# Patient Record
Sex: MALE | Race: WHITE | URBAN - METROPOLITAN AREA
[De-identification: names, ages, dates, MRNs, and addresses within clinical notes are randomized per-mention and may not be internally consistent; named-entity substitution may affect disease eponyms.]

---

## 2018-12-05 ENCOUNTER — OFFICE VISIT (OUTPATIENT)
Dept: INTERNAL MEDICINE | Facility: CLINIC | Age: 23
End: 2018-12-05
Payer: COMMERCIAL

## 2018-12-05 VITALS
HEART RATE: 70 BPM | OXYGEN SATURATION: 99 % | DIASTOLIC BLOOD PRESSURE: 80 MMHG | WEIGHT: 167 LBS | TEMPERATURE: 98.1 F | SYSTOLIC BLOOD PRESSURE: 130 MMHG | BODY MASS INDEX: 22.34 KG/M2

## 2018-12-05 DIAGNOSIS — K60.2 ANAL FISSURE: Primary | ICD-10-CM

## 2018-12-05 DIAGNOSIS — K62.5 BRBPR (BRIGHT RED BLOOD PER RECTUM): ICD-10-CM

## 2018-12-05 PROCEDURE — 99213 OFFICE O/P EST LOW 20 MIN: CPT | Performed by: INTERNAL MEDICINE

## 2018-12-05 NOTE — PATIENT INSTRUCTIONS
"Anal Fissure:     *  A small \"split\" in the anal canal, almost always a self limited event, healing within days.       *  No specific treatment needed no specific further testing needed unless the symptoms do not get better or if the symptoms change or worsen.      *  Normalize the bowel patterns by increasing the intake of fiber and maintaining adequate water intake (at least 6-8 glasses per day).    *  Take the fiber supplement of your choice:  Capsules, tablets, chewable tablets, powders, etc.   Find one that works best for you and take it regularly.    *  If the stools are consistently harder, then consider a stool softner such as colace (docusate) or senekot to help make the BMs larger and softer so they pass easier.      *  Preparation H (suppositories or cream) or similar over the counter product to help sooth these irritations.    *  Consider using Preparation H wipes after passing BMs as needed.  Do not overcleanse the rectal area as this will lead to more rectal irritation.     *  Sometimes sitting in a bathtub with Epsom salts for 10-15 minutes can help hemorrhoids as well.     *  If you continue to have troubles, then we may need to send you for further testing, but nothing specifically needed now.     "

## 2018-12-05 NOTE — PROGRESS NOTES
SUBJECTIVE:   Kishore Thomas is a 23 year old male who presents to clinic today for the following health issues:      Concern - Blood toilet paper   Onset: 2 months     Description:   Off and on blood on toilet paper after stools for about 2 months  BMs formed, solid, 1-2 times per day.  Notices blood on the toilet paper about half the time.   occ blood streaked on the stool itself.   No straining,   No diarrhea, no melena, no hematochezia.   No fevers, no chills, no abd cramping.   No change sin diet, no changes in fluid intake  No new meds.   No family history of GI cancer or inflammatory bowel disease      Intensity: mild    Progression of Symptoms:  same    Accompanying Signs & Symptoms:  none    Previous history of similar problem:   None     Precipitating factors:   Worsened by: none     Alleviating factors:  Improved by: none     Therapies Tried and outcome: none         Problem list and histories reviewed & adjusted, as indicated.  Additional history: as documented        Reviewed and updated as needed this visit by clinical staff  Allergies  Meds       Reviewed and updated as needed this visit by Provider           Past Medical History:  ---------------------------  Past Medical History:   Diagnosis Date     Varicella without mention of complication        Past Surgical History:  ---------------------------  Past Surgical History:   Procedure Laterality Date     NO HISTORY OF SURGERY         Current Medications:  ---------------------------  Current Outpatient Prescriptions   Medication Sig Dispense Refill     Ibuprofen (ADVIL) 200 MG capsule Take 200 mg by mouth as needed          Allergies:  -------------  Allergies   Allergen Reactions     Nkda [No Known Drug Allergies]        Social History:  -------------------  Social History     Social History     Marital status: Single     Spouse name: N/A     Number of children: N/A     Years of education: N/A     Occupational History      [employer]      Social History Main Topics     Smoking status: Light Tobacco Smoker     Smokeless tobacco: Never Used     Alcohol use 0.0 oz/week     0 Standard drinks or equivalent per week      Comment: occ social use     Drug use: No      Comment: 2/6/2013 kv     Sexual activity: No      Comment: 2/6/13 kv     Other Topics Concern     Not on file     Social History Narrative       Family Medical History:  ------------------------------  Family History   Problem Relation Age of Onset     No Known Problems Mother      No Known Problems Father      No Known Problems Brother      Colon Cancer No family hx of      Inflammatory Bowel Disease No family hx of      Coronary Artery Disease Early Onset No family hx of          ROS:  REVIEW OF SYSTEMS:    RESP: negative for cough, dyspnea, wheezing, hemoptysis  CV: negative for chest pain, palpitations, PND, GOLDMAN, orthopnea; reports no changes in their ability to perform physical activity (from cardiovascular standpoint)  GI: negative for dysphagia, N/V, pain, melena, diarrhea and constipation  NEURO: negative for numbness/tingling, paralysis, incoordination, or focal weakness     OBJECTIVE:                                                    /80  Pulse 70  Temp 98.1  F (36.7  C) (Oral)  Wt 167 lb (75.8 kg)  SpO2 99%  BMI 22.34 kg/m2     GENERAL alert and no distress  EYES:  Normal sclera,conjunctiva, EOMI  HENT: oral and posterior pharynx without lesions or erythema, facies symmetric  NECK: Neck supple. No LAD, without thyroidmegaly or JVD., Carotids without bruits.  RESP: Clear to ausculation bilaterally without wheezes or crackles. Normal BS in all fields.  CV: RRR normal S1S2 without murmurs, rubs or gallops. PMI normal  LYMPH: no cervical lymph adenopathy appreciated  MS: extremities- no gross deformities of the visible extremities noted, no edema  PSYCH: Alert and oriented times 3; speech- coherent  SKIN:  No obvious significant skin lesions on visible portions of face    RECTAL:  No external lesions.   ANOSCOPY:  No hemorrhoids, anal fissure at 600 position         ASSESSMENT/PLAN:                                                      (K60.2) Anal fissure  (primary encounter diagnosis)  Comment: Reviewed anal fissures at length.  Discussed them as being one of the most common causes for bright red blood per rectum.    Discussed the link between constipation and smaller, harder stools or with straining or excessive bowel movements.    Advised fiber supplement, 6-8 glasses of water daily.   Discussed the fact that they generally will heal quickly on their own, but can re-appear easily if the bowel movements change towards more constipation or more frequent.  Further workup with colonoscopy only to be done if symptoms progress or worsen.   Refer to colorectal surgeons if needed.    Plan:     (K62.5) BRBPR (bright red blood per rectum)  Comment: anal fissure  Of this does not improve with ocnserative measures, then will need endoscopy.   He promised to let me know.   Plan:       See Patient Instructions    RUPALI CASTAÑEDA M.D., MD  [facility]

## 2018-12-05 NOTE — MR AVS SNAPSHOT
"              After Visit Summary   12/5/2018    Kishore Thomas    MRN: [id number]           Patient Information     Date Of Birth          1995        Visit Information        Provider Department      12/5/2018 8:00 AM Zach Mack MD [facility]        Today's Diagnoses     Anal fissure    -  1    BRBPR (bright red blood per rectum)          Care Instructions    Anal Fissure:     *  A small \"split\" in the anal canal, almost always a self limited event, healing within days.       *  No specific treatment needed no specific further testing needed unless the symptoms do not get better or if the symptoms change or worsen.      *  Normalize the bowel patterns by increasing the intake of fiber and maintaining adequate water intake (at least 6-8 glasses per day).    *  Take the fiber supplement of your choice:  Capsules, tablets, chewable tablets, powders, etc.   Find one that works best for you and take it regularly.    *  If the stools are consistently harder, then consider a stool softner such as colace (docusate) or senekot to help make the BMs larger and softer so they pass easier.      *  Preparation H (suppositories or cream) or similar over the counter product to help sooth these irritations.    *  Consider using Preparation H wipes after passing BMs as needed.  Do not overcleanse the rectal area as this will lead to more rectal irritation.     *  Sometimes sitting in a bathtub with Epsom salts for 10-15 minutes can help hemorrhoids as well.     *  If you continue to have troubles, then we may need to send you for further testing, but nothing specifically needed now.             Follow-ups after your visit        Who to contact     If you have questions or need follow up information about today's clinic visit or your schedule please contact [facility] directly at [phone].  Normal or non-critical lab and imaging results will be communicated " to you by MyChart, letter or phone within 4 business days after the clinic has received the results. If you do not hear from us within 7 days, please contact the clinic through MyChart or phone. If you have a critical or abnormal lab result, we will notify you by phone as soon as possible.  Submit refill requests through [employer] or call your pharmacy and they will forward the refill request to us. Please allow 3 business days for your refill to be completed.          Additional Information About Your Visit        Care EveryWhere ID     This is your Care EveryWhere ID. This could be used by other organizations to access your [place] medical records  [id number]        Your Vitals Were     Pulse Temperature Pulse Oximetry BMI (Body Mass Index)          70 98.1  F (36.7  C) (Oral) 99% 22.34 kg/m2         Blood Pressure from Last 3 Encounters:   12/05/18 130/80   08/10/16 120/80   02/06/13 122/84    Weight from Last 3 Encounters:   12/05/18 167 lb (75.8 kg)   08/10/16 165 lb (74.8 kg)   02/06/13 145 lb 1 oz (65.8 kg) (41 %)*     * Growth percentiles are based on CDC 2-20 Years data.              Today, you had the following     No orders found for display       Primary Care Provider Office Phone # Fax #    Mandeep Adorno [phone]       [address] [facility] [address]        Equal Access to Services     DIVINA NOWAK : Hadii aad ku hadasho Soomaali, waaxda luqadaha, qaybta kaalmada adeegyada, belgica eason . So [facility] [phone].    ATENCIÓN: Si habla español, tiene a bella disposición servicios gratuitos de asistencia lingüística. Llame al [phone].    We comply with applicable federal civil rights laws and [place] laws. We do not discriminate on the basis of race, color, national origin, age, disability, sex, sexual orientation, or gender identity.            Thank you!     Thank you for choosing [facility]  for your care. Our goal is  always to provide you with excellent care. Hearing back from our patients is one way we can continue to improve our services. Please take a few minutes to complete the written survey that you may receive in the mail after your visit with us. Thank you!             Your Updated Medication List - Protect others around you: Learn how to safely use, store and throw away your medicines at www.disposemymeds.org.          This list is accurate as of 12/5/18  8:37 AM.  Always use your most recent med list.                   Brand Name Dispense Instructions for use Diagnosis    ADVIL 200 MG capsule   Generic drug:  ibuprofen      Take 200 mg by mouth as needed    Contusion of leg